# Patient Record
Sex: MALE
[De-identification: names, ages, dates, MRNs, and addresses within clinical notes are randomized per-mention and may not be internally consistent; named-entity substitution may affect disease eponyms.]

---

## 2018-12-20 PROBLEM — Z00.00 ENCOUNTER FOR PREVENTIVE HEALTH EXAMINATION: Status: ACTIVE | Noted: 2018-12-20

## 2018-12-22 ENCOUNTER — APPOINTMENT (OUTPATIENT)
Dept: FAMILY MEDICINE | Facility: CLINIC | Age: 23
End: 2018-12-22
Payer: COMMERCIAL

## 2018-12-22 VITALS
OXYGEN SATURATION: 98 % | RESPIRATION RATE: 12 BRPM | BODY MASS INDEX: 19.77 KG/M2 | DIASTOLIC BLOOD PRESSURE: 80 MMHG | HEIGHT: 66 IN | SYSTOLIC BLOOD PRESSURE: 120 MMHG | HEART RATE: 78 BPM | WEIGHT: 123 LBS

## 2018-12-22 DIAGNOSIS — Z82.0 FAMILY HISTORY OF EPILEPSY AND OTHER DISEASES OF THE NERVOUS SYSTEM: ICD-10-CM

## 2018-12-22 DIAGNOSIS — Z82.49 FAMILY HISTORY OF ISCHEMIC HEART DISEASE AND OTHER DISEASES OF THE CIRCULATORY SYSTEM: ICD-10-CM

## 2018-12-22 DIAGNOSIS — Z78.9 OTHER SPECIFIED HEALTH STATUS: ICD-10-CM

## 2018-12-22 DIAGNOSIS — Z82.3 FAMILY HISTORY OF STROKE: ICD-10-CM

## 2018-12-22 DIAGNOSIS — Z86.59 PERSONAL HISTORY OF OTHER MENTAL AND BEHAVIORAL DISORDERS: ICD-10-CM

## 2018-12-22 PROCEDURE — 99204 OFFICE O/P NEW MOD 45 MIN: CPT

## 2018-12-22 NOTE — HISTORY OF PRESENT ILLNESS
[FreeTextEntry8] : pt presents to establish care as new patient  He has been having issues with depression. He is seeing a therapist and they have advised he should be on treatment.

## 2018-12-22 NOTE — PHYSICAL EXAM
[No Acute Distress] : no acute distress [Well Nourished] : well nourished [Well Developed] : well developed [Well-Appearing] : well-appearing [No JVD] : no jugular venous distention [Supple] : supple [No Lymphadenopathy] : no lymphadenopathy [No Respiratory Distress] : no respiratory distress  [Clear to Auscultation] : lungs were clear to auscultation bilaterally [Normal Rate] : normal rate  [Regular Rhythm] : with a regular rhythm [No Murmur] : no murmur heard [Soft] : abdomen soft [Non Tender] : non-tender [No HSM] : no HSM [Normal Bowel Sounds] : normal bowel sounds [Normal Posterior Cervical Nodes] : no posterior cervical lymphadenopathy [Normal Anterior Cervical Nodes] : no anterior cervical lymphadenopathy [Speech Grossly Normal] : speech grossly normal [Memory Grossly Normal] : memory grossly normal [Alert and Oriented x3] : oriented to person, place, and time [Normal Insight/Judgement] : insight and judgment were intact [de-identified] : flat affect

## 2018-12-22 NOTE — ASSESSMENT
[FreeTextEntry1] : WE discussed his care by therapist. Graduated from [employer] and has a "great" job in the city. long term issues with depression and we discussed treatment with a medication and he is agreeable to this. Rx for escitalopram 10 mg - for days 1-5 he will use 1/2 tab a day then day 6 on he will use 1 tab a day. Use/precautions/side effects reviewed. He is aware to call me with any concerns and will RTO in 4-5 weeks. Diet/exercise discussed.

## 2018-12-22 NOTE — HEALTH RISK ASSESSMENT
[No falls in past year] : Patient reported no falls in the past year [0] : 1) Little interest or pleasure doing things: Not at all (0) [1] : 2) Feeling down, depressed, or hopeless for several days (1) [] : No [de-identified] : social [[id number]] : 1

## 2019-01-12 ENCOUNTER — APPOINTMENT (OUTPATIENT)
Dept: FAMILY MEDICINE | Facility: CLINIC | Age: 24
End: 2019-01-12
Payer: COMMERCIAL

## 2019-01-12 VITALS
RESPIRATION RATE: 16 BRPM | DIASTOLIC BLOOD PRESSURE: 90 MMHG | HEART RATE: 101 BPM | WEIGHT: 120 LBS | SYSTOLIC BLOOD PRESSURE: 124 MMHG | OXYGEN SATURATION: 98 %

## 2019-01-12 DIAGNOSIS — S01.01XA LACERATION W/OUT FOREIGN BODY OF SCALP, INITIAL ENCOUNTER: ICD-10-CM

## 2019-01-12 DIAGNOSIS — F10.10 ALCOHOL ABUSE, UNCOMPLICATED: ICD-10-CM

## 2019-01-12 PROCEDURE — 99213 OFFICE O/P EST LOW 20 MIN: CPT

## 2019-01-14 PROBLEM — S01.01XA SCALP LACERATION, INITIAL ENCOUNTER: Status: ACTIVE | Noted: 2019-01-14

## 2019-01-14 PROBLEM — F10.10 ALCOHOL ABUSE: Status: ACTIVE | Noted: 2019-01-12

## 2019-01-14 NOTE — HISTORY OF PRESENT ILLNESS
[FreeTextEntry1] : pt presents for staples follow up  [de-identified] : Pt fell on Thursday - brought to a hospital - treated for scalp laceration but doesn't know where.  He has a history of alcohol abuse.

## 2019-01-14 NOTE — PHYSICAL EXAM
[No Acute Distress] : no acute distress [Well Nourished] : well nourished [Well Developed] : well developed [Well-Appearing] : well-appearing [Normal Sclera/Conjunctiva] : normal sclera/conjunctiva [PERRL] : pupils equal round and reactive to light [EOMI] : extraocular movements intact [No JVD] : no jugular venous distention [Supple] : supple [No Lymphadenopathy] : no lymphadenopathy [No Respiratory Distress] : no respiratory distress  [Clear to Auscultation] : lungs were clear to auscultation bilaterally [Normal Rate] : normal rate  [No Murmur] : no murmur heard [Normal Posterior Cervical Nodes] : no posterior cervical lymphadenopathy [Normal Anterior Cervical Nodes] : no anterior cervical lymphadenopathy [Speech Grossly Normal] : speech grossly normal [Memory Grossly Normal] : memory grossly normal [Alert and Oriented x3] : oriented to person, place, and time [Normal Insight/Judgement] : insight and judgment were intact [de-identified] : 2 lacerations - stapled  clean , a lot of dried blood [de-identified] : flat affect

## 2019-01-14 NOTE — ASSESSMENT
[FreeTextEntry1] : 1.  Reviewed wound care. Wound is clean and he will RTO in 1 week.  2. and 3.  I increased his dose of lexapro to 15 mg a day and gave referrals to offices for evaluation for alcohol abuse and depression. Long discussion with pt and parents.

## 2019-01-19 ENCOUNTER — APPOINTMENT (OUTPATIENT)
Dept: FAMILY MEDICINE | Facility: CLINIC | Age: 24
End: 2019-01-19

## 2019-01-21 ENCOUNTER — APPOINTMENT (OUTPATIENT)
Dept: FAMILY MEDICINE | Facility: CLINIC | Age: 24
End: 2019-01-21

## 2019-01-26 ENCOUNTER — APPOINTMENT (OUTPATIENT)
Dept: FAMILY MEDICINE | Facility: CLINIC | Age: 24
End: 2019-01-26

## 2020-08-24 ENCOUNTER — APPOINTMENT (OUTPATIENT)
Dept: FAMILY MEDICINE | Facility: CLINIC | Age: 25
End: 2020-08-24
Payer: COMMERCIAL

## 2020-08-24 VITALS
BODY MASS INDEX: 20.09 KG/M2 | SYSTOLIC BLOOD PRESSURE: 130 MMHG | TEMPERATURE: 98.5 F | DIASTOLIC BLOOD PRESSURE: 80 MMHG | WEIGHT: 125 LBS | RESPIRATION RATE: 14 BRPM | OXYGEN SATURATION: 98 % | HEART RATE: 94 BPM | HEIGHT: 66 IN

## 2020-08-24 DIAGNOSIS — Z13.29 ENCOUNTER FOR SCREENING FOR OTHER SUSPECTED ENDOCRINE DISORDER: ICD-10-CM

## 2020-08-24 DIAGNOSIS — G47.00 INSOMNIA, UNSPECIFIED: ICD-10-CM

## 2020-08-24 PROCEDURE — 99213 OFFICE O/P EST LOW 20 MIN: CPT

## 2020-08-24 RX ORDER — ESCITALOPRAM OXALATE 10 MG/1
10 TABLET ORAL
Qty: 60 | Refills: 6 | Status: COMPLETED | COMMUNITY
Start: 2018-12-22 | End: 2020-08-24

## 2020-08-24 NOTE — HISTORY OF PRESENT ILLNESS
[FreeTextEntry1] : pt presents for med check  [de-identified] : Patient reports today for follow up. Patient reports he has some ongoing depression. He reports he has had more difficulty sleeping and motivation to do things. He has tried lexapro in the past but he states he did not see much of an improvement. He is open to trying another medication. Denies any suicidal thoughts and plans.

## 2020-08-24 NOTE — ASSESSMENT
[FreeTextEntry1] : Start sertraline 25mg PO qHS nightly \par PHQ9: 17  \par Check labs drawn in office today for above assessed conditions. Labs to be resulted at the [facility] core lab.\par Vitals and exam stable \par RTO in 4-6 weeks for follow up

## 2020-08-25 LAB
ALBUMIN SERPL ELPH-MCNC: 5.3 G/DL
ALP BLD-CCNC: 68 U/L
ALT SERPL-CCNC: 14 U/L
ANION GAP SERPL CALC-SCNC: 14 MMOL/L
AST SERPL-CCNC: 21 U/L
BASOPHILS # BLD AUTO: 0.03 K/UL
BASOPHILS NFR BLD AUTO: 0.4 %
BILIRUB SERPL-MCNC: 1.1 MG/DL
BUN SERPL-MCNC: 12 MG/DL
CALCIUM SERPL-MCNC: 10.4 MG/DL
CHLORIDE SERPL-SCNC: 99 MMOL/L
CO2 SERPL-SCNC: 27 MMOL/L
CREAT SERPL-MCNC: 0.81 MG/DL
EOSINOPHIL # BLD AUTO: 0.08 K/UL
EOSINOPHIL NFR BLD AUTO: 1.2 %
GLUCOSE SERPL-MCNC: 114 MG/DL
HCT VFR BLD CALC: 51.4 %
HGB BLD-MCNC: 16.1 G/DL
IMM GRANULOCYTES NFR BLD AUTO: 0.1 %
LYMPHOCYTES # BLD AUTO: 1.46 K/UL
LYMPHOCYTES NFR BLD AUTO: 21.7 %
MAN DIFF?: NORMAL
MCHC RBC-ENTMCNC: 29.8 PG
MCHC RBC-ENTMCNC: 31.3 GM/DL
MCV RBC AUTO: 95.2 FL
MONOCYTES # BLD AUTO: 0.49 K/UL
MONOCYTES NFR BLD AUTO: 7.3 %
NEUTROPHILS # BLD AUTO: 4.66 K/UL
NEUTROPHILS NFR BLD AUTO: 69.3 %
PLATELET # BLD AUTO: 249 K/UL
POTASSIUM SERPL-SCNC: 4.8 MMOL/L
PROT SERPL-MCNC: 7.9 G/DL
RBC # BLD: 5.4 M/UL
RBC # FLD: 12.4 %
SODIUM SERPL-SCNC: 141 MMOL/L
T4 SERPL-MCNC: 6.5 UG/DL
TSH SERPL-ACNC: 1.04 UIU/ML
WBC # FLD AUTO: 6.73 K/UL

## 2020-08-29 ENCOUNTER — APPOINTMENT (OUTPATIENT)
Dept: FAMILY MEDICINE | Facility: CLINIC | Age: 25
End: 2020-08-29
Payer: COMMERCIAL

## 2020-08-29 VITALS
WEIGHT: 125 LBS | HEIGHT: 66 IN | SYSTOLIC BLOOD PRESSURE: 118 MMHG | TEMPERATURE: 98.7 F | DIASTOLIC BLOOD PRESSURE: 80 MMHG | BODY MASS INDEX: 20.09 KG/M2 | OXYGEN SATURATION: 95 % | RESPIRATION RATE: 14 BRPM | HEART RATE: 72 BPM

## 2020-08-29 PROCEDURE — 99214 OFFICE O/P EST MOD 30 MIN: CPT

## 2020-08-29 NOTE — HISTORY OF PRESENT ILLNESS
[FreeTextEntry1] : patient presents for follow up on medications  [de-identified] : 26yo male presents for depression\par he has been feeling down since 11th grade\par Denies suicidal or homicidal ideations\par \par he drinks every weekend, 5-7 drinks per day  and he drinks fri and sat and sometimes thursdays\par [place] from his parents\par \par last yr he fell and was drunk and unconscious and bleeding\par he went to the ED at that time\par \par he was on lexapro in the past but it didn’t help \par \par last weekend was his birthday and he had bottomless brunch and he drank a lot of alcohol and his mother said he was "wasted"\par now he will be living with his parents and they are giving him a lot ofsupport\par \par he saw a new therapist today in [place]\par \par he wants to find a new one\par \par he feels that he was drinking bc he was hiding a secret that he is buchanan\par he came out 2 days ago and he feels better about it now\par he said in march he stopped drinking for 50 days to do a cleanse and he had no withdrawl symptoms and felt fine\par he said he doesn’t drink during the week or let it affect his daily activities including work\par 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

## 2020-08-29 NOTE — PLAN
[FreeTextEntry1] : \par \par depression w/ coping with being buch[place],  just recently came out and knows he needs to stop drinking alcohol\par had an in depth talk with patient and his parents as he allowed them to stay in the room and interject at times\par he wants to stop drinking excessively and he feels like he can\par he wants to get help for his depression\par he feels he was depressed due to being [place] and having this as a secret from everyone and now he feels better since he came out \par he wants to increase the zoloft but advised he cannot drink on this as if he does he can have a bad reaction including death\par he and his parents understand this\par he feels he can stop drinking on his own and he doesn’t need any help at this point\par he feels that he wants to see a different therapist, advised to go to insurance website and to find a therapist who iscovered and then check reviews, i think he would benefit from a younger therapist who is around his age and can relate to him and he agrees\par will raise zoloft to 50mg po daily for now but will hold off on any other meds as i am worried about his drinking, he promised me he would not drink alcohol on the medication and i believe him\par i told him we should increase the medication slowly as this is a big adjustment to stop drinking on weekends and now taking a higher dose of medication\par i don’t want to raise zoloft too high in case he ends up binge drinking on it for now\par i advised close follow up and he agreed\par tsh and free t4 normal, ltfts normal\par \par advised to f/u 2 weeks\par advised to call with any questions or concerns\par advised if he feels he needs help with drinking he may need to enter classes/ outside help to help him \par advised if suicidal or homicidal this is a medical emergency and he needs to go to the ED\par pt and parents understood and agreed w/plan \par \par

## 2020-08-29 NOTE — PHYSICAL EXAM
[No Lymphadenopathy] : no lymphadenopathy [Supple] : supple [Normal] : normal rate, regular rhythm, normal S1 and S2 and no murmur heard [No Edema] : there was no peripheral edema [No Focal Deficits] : no focal deficits [Normal Affect] : the affect was normal [Normal Mood] : the mood was normal [de-identified] : sunburned skin  [Normal Insight/Judgement] : insight and judgment were intact

## 2020-09-09 ENCOUNTER — APPOINTMENT (OUTPATIENT)
Dept: FAMILY MEDICINE | Facility: CLINIC | Age: 25
End: 2020-09-09
Payer: COMMERCIAL

## 2020-09-09 VITALS
SYSTOLIC BLOOD PRESSURE: 140 MMHG | BODY MASS INDEX: 20.09 KG/M2 | HEART RATE: 94 BPM | WEIGHT: 125 LBS | OXYGEN SATURATION: 98 % | TEMPERATURE: 98.2 F | DIASTOLIC BLOOD PRESSURE: 80 MMHG | RESPIRATION RATE: 14 BRPM | HEIGHT: 66 IN

## 2020-09-09 DIAGNOSIS — Z23 ENCOUNTER FOR IMMUNIZATION: ICD-10-CM

## 2020-09-09 PROCEDURE — G0008: CPT

## 2020-09-09 PROCEDURE — 99213 OFFICE O/P EST LOW 20 MIN: CPT | Mod: 25

## 2020-09-09 PROCEDURE — 90686 IIV4 VACC NO PRSV 0.5 ML IM: CPT

## 2020-09-09 NOTE — PHYSICAL EXAM
[No Lymphadenopathy] : no lymphadenopathy [Supple] : supple [Normal] : no respiratory distress, lungs were clear to auscultation bilaterally and no accessory muscle use [No Edema] : there was no peripheral edema [No Focal Deficits] : no focal deficits [Normal Affect] : the affect was normal [Normal Mood] : the mood was normal [Normal Insight/Judgement] : insight and judgment were intact

## 2020-09-09 NOTE — HISTORY OF PRESENT ILLNESS
[de-identified] : 26yo male presents for depression and flu vaccine \par he is feeling a lot better\par \par he found a new therapist and has the first appointment on saturday with a younger therapist that is more relatable\par \par he is taking medication daily\par Denies suicidal or homicidal ideations\par \par he hasn't drank any alcohol since last visit\par he went out with his friends and he was the designated  and didn’t drink\par \par  [FreeTextEntry1] : patient presents for follow up with medication

## 2020-09-09 NOTE — PLAN
[FreeTextEntry1] : \par depression\par improving on zoloft 50mg po daily\par continue current regimen\par therapist appt is this week\par \par binge drinking resolved\par \par flu vaccine given left arm IM\par no adverse reactions noted, consent obtained\par \par \par f/u 2-3 weeks or sooner if worsening pt agreed w/plan

## 2020-09-28 ENCOUNTER — APPOINTMENT (OUTPATIENT)
Dept: FAMILY MEDICINE | Facility: CLINIC | Age: 25
End: 2020-09-28

## 2020-10-09 ENCOUNTER — APPOINTMENT (OUTPATIENT)
Dept: FAMILY MEDICINE | Facility: CLINIC | Age: 25
End: 2020-10-09

## 2020-10-21 ENCOUNTER — APPOINTMENT (OUTPATIENT)
Dept: FAMILY MEDICINE | Facility: CLINIC | Age: 25
End: 2020-10-21
Payer: COMMERCIAL

## 2020-10-21 VITALS — RESPIRATION RATE: 14 BRPM | HEIGHT: 67 IN | HEART RATE: 96 BPM | WEIGHT: 125 LBS | BODY MASS INDEX: 19.62 KG/M2

## 2020-10-21 DIAGNOSIS — F32.9 MAJOR DEPRESSIVE DISORDER, SINGLE EPISODE, UNSPECIFIED: ICD-10-CM

## 2020-10-21 DIAGNOSIS — F10.10 ALCOHOL ABUSE, UNCOMPLICATED: ICD-10-CM

## 2020-10-21 PROCEDURE — 99213 OFFICE O/P EST LOW 20 MIN: CPT | Mod: 95

## 2020-10-21 NOTE — REVIEW OF SYSTEMS
[Depression] : depression [Fever] : no fever [de-identified] : depression occurring now more frequently w/ work stress

## 2020-10-21 NOTE — PHYSICAL EXAM
[Normal] : no acute distress, well nourished, well developed and well-appearing [Normal Sclera/Conjunctiva] : normal sclera/conjunctiva [EOMI] : extraocular movements intact [Normal Outer Ear/Nose] : the outer ears and nose were normal in appearance [Normal Oropharynx] : the oropharynx was normal [No Lymphadenopathy] : no lymphadenopathy [Supple] : supple [No Focal Deficits] : no focal deficits [Normal Affect] : the affect was normal [Normal Mood] : the mood was normal [Normal Insight/Judgement] : insight and judgment were intact [de-identified] : no swelling in neck as per patient palpating [de-identified] : normal chest rise with inhalation and chest fall with exhalation \par \par

## 2020-10-21 NOTE — PLAN
[FreeTextEntry1] : \par depression now worsening since being in the city and w/ work stress \par increase zoloft 100mg po daily\par continue therapy \par Denies suicidal or homicidal ideations\par \par \par binge drinking resolved and completely stopped \par \par f/u 1 month  or sooner if worsening pt agreed w/plan

## 2020-10-21 NOTE — HISTORY OF PRESENT ILLNESS
[Home] : at home, [unfilled] , at the time of the visit. [Medical Office: ([facility])___] : at the medical office located in  [Verbal consent obtained from patient] : the patient, [unfilled] [FreeTextEntry1] : patient presents for follow up for depression  [de-identified] : 24yo male presents for depression \par \par he found a new therapist and he is going regularly weekly every saturday \par \par he is taking medication daily\par \par he gave up drinking completely \par he said he has been stressed and he has been doing more exercise to alleviate it but it after he exercises and goes back to his stressful work he has stress again \par he has had more work piled onto him making his depression worse\par he said he cried the other night and he hasn’t for a while\par Denies suicidal or homicidal ideations\par \par he is open to increasing zoloft\par

## 2021-01-10 RX ORDER — SERTRALINE HYDROCHLORIDE 100 MG/1
100 TABLET, FILM COATED ORAL DAILY
Qty: 90 | Refills: 1 | Status: ACTIVE | COMMUNITY
Start: 2020-10-21 | End: 1900-01-01

## 2021-01-10 RX ORDER — SERTRALINE HYDROCHLORIDE 50 MG/1
50 TABLET, FILM COATED ORAL
Qty: 30 | Refills: 2 | Status: DISCONTINUED | COMMUNITY
Start: 2020-08-24 | End: 2021-01-10